# Patient Record
(demographics unavailable — no encounter records)

---

## 2025-03-09 NOTE — HISTORY OF PRESENT ILLNESS
[TextBox_4] : 78 year old woman multipl resection for otherwise healthy great spiroemtry flows. but low diffusion from emphysema notes dyspnea, likely due to deconditioning as well med  repeat ct in august. 77 year old female, former smoker (1 PPD x 42 years, quit in 2007) initially presented for a suspicious LLL percutaneous biopsy done at [place] in June 2015. She is s/p minimally invasive left lower lobectomy (7/8/2015). She had 2 separate synchronous primary lung carcinomas.  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  She received 4 rounds of adjuvant chemotherapy completed in November 2015. She underwent a R VATS, RA, RLL segmentectomy, and MLND on 11/08/21 with pathology revealing pT1mi, pN0 minimally invasive adenocarcinoma. She will be 3 years post op this November. She presents today to review her 6 month ct of the chest.

## 2025-03-09 NOTE — REVIEW OF SYSTEMS
[Dyspnea] : dyspnea [Frequency] : frequency [Negative] : Endocrine [Cough] : no cough [Sputum] : no sputum [TextBox_30] : much improved dyspnea

## 2025-03-09 NOTE — PHYSICAL EXAM
[No Acute Distress] : no acute distress [Normal Oropharynx] : normal oropharynx [Normal Appearance] : normal appearance [No Neck Mass] : no neck mass [Normal Rate/Rhythm] : normal rate/rhythm [Normal S1, S2] : normal s1, s2 [No Resp Distress] : no resp distress [Clear to Auscultation Bilaterally] : clear to auscultation bilaterally [Surgical scars] : surgical scars [Benign] : benign [Normal Gait] : normal gait [No Clubbing] : no clubbing [No Cyanosis] : no cyanosis [No Edema] : no edema [FROM] : FROM [Normal Color/ Pigmentation] : normal color/ pigmentation [No Focal Deficits] : no focal deficits [Oriented x3] : oriented x3 [Normal Affect] : normal affect [TextBox_54] : systolic murmu  aortic sclerosis non stenosis on previous ct scan

## 2025-03-09 NOTE — DISCUSSION/SUMMARY
[FreeTextEntry1] : balbina well, reviewed all meds she is on and also other medical issues fu ct in august would profit from pulmonary rehab for improvement in exercise tolerance and exertional dyspnea negative...

## 2025-03-09 NOTE — DISCUSSION/SUMMARY
[FreeTextEntry1] : balbina well, reviewed all meds she is on and also other medical issues fu ct in august would profit from pulmonary rehab for improvement in exercise tolerance and exertional dyspnea

## 2025-03-09 NOTE — HISTORY OF PRESENT ILLNESS
[TextBox_4] : 78 year old woman multipl resection for otherwise healthy great spiroemtry flows. but low diffusion from emphysema notes dyspnea, likely due to deconditioning as well med  repeat ct in august. 77 year old female, former smoker (1 PPD x 42 years, quit in 2007) initially presented for a suspicious LLL percutaneous biopsy done at [place] in June 2015. She is s/p minimally invasive left lower lobectomy (7/8/2015). She had 2 separate synchronous primary lung carcinomas.  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  She received 4 rounds of adjuvant chemotherapy completed in November 2015. She underwent a R VATS, RA, RLL segmentectomy, and MLND on 11/08/21 with pathology revealing pT1mi, pN0 minimally invasive adenocarcinoma. She will be 3 years post op this November. She presents today to review her 6 month ct of the chest.

## 2025-03-09 NOTE — HISTORY OF PRESENT ILLNESS
[TextBox_4] : 78 year old woman multipl resection for otherwise healthy great spiroemtry flows. but low diffusion from emphysema notes dyspnea, likely due to deconditioning as well med  repeat ct in august. 77 year old female, former smoker (1 PPD x 42 years, quit in 2007) initially presented for a suspicious LLL percutaneous biopsy done at [place] in June 2015. She is s/p minimally invasive left lower lobectomy (7/8/2015). She had 2 separate synchronous primary lung carcinomas.  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  She received 4 rounds of adjuvant chemotherapy completed in November 2015. She underwent a R VATS, RA, RLL segmentectomy, and MLND on 11/08/21 with pathology revealing pT1mi, pN0 minimally invasive adenocarcinoma. She will be 3 years post op this November. She presents today to review her 6 month ct of the chest.